# Patient Record
Sex: FEMALE | Race: WHITE | NOT HISPANIC OR LATINO | Employment: OTHER | URBAN - METROPOLITAN AREA
[De-identification: names, ages, dates, MRNs, and addresses within clinical notes are randomized per-mention and may not be internally consistent; named-entity substitution may affect disease eponyms.]

---

## 2017-07-18 ENCOUNTER — OFFICE VISIT (OUTPATIENT)
Dept: GASTROENTEROLOGY | Facility: CLINIC | Age: 67
End: 2017-07-18

## 2017-07-18 VITALS
SYSTOLIC BLOOD PRESSURE: 132 MMHG | HEIGHT: 66 IN | BODY MASS INDEX: 26.74 KG/M2 | DIASTOLIC BLOOD PRESSURE: 86 MMHG | WEIGHT: 166.4 LBS | TEMPERATURE: 98.6 F

## 2017-07-18 DIAGNOSIS — R10.32 LLQ PAIN: Primary | ICD-10-CM

## 2017-07-18 DIAGNOSIS — R19.7 DIARRHEA, UNSPECIFIED TYPE: ICD-10-CM

## 2017-07-18 PROCEDURE — 99204 OFFICE O/P NEW MOD 45 MIN: CPT | Performed by: INTERNAL MEDICINE

## 2017-07-18 RX ORDER — ALBUTEROL SULFATE 90 UG/1
2 AEROSOL, METERED RESPIRATORY (INHALATION) EVERY 4 HOURS PRN
COMMUNITY

## 2017-07-18 RX ORDER — DICYCLOMINE HCL 20 MG
20 TABLET ORAL EVERY 6 HOURS PRN
Qty: 60 TABLET | Refills: 3 | Status: SHIPPED | OUTPATIENT
Start: 2017-07-18 | End: 2021-06-01

## 2017-07-18 RX ORDER — MONTELUKAST SODIUM 10 MG/1
10 TABLET ORAL NIGHTLY
COMMUNITY
Start: 2017-06-02

## 2017-07-18 RX ORDER — LEVOTHYROXINE SODIUM 88 UG/1
88 TABLET ORAL
COMMUNITY
Start: 2017-06-02

## 2017-07-18 NOTE — PROGRESS NOTES
Chief Complaint   Patient presents with   • Irritable Bowel Syndrome   • Abdominal Pain     LLQ     Linsey Garcia is a 67 y.o. female who presents with IBS-a and LLQ pain.  Last c/s 2014 by Dr arthur - diverticulosis, .  ZOILA had CRC at age 86.  She has some vaginal prolapse - hard to empty with fiber.  She had a CT in 2014 - pain was milder at that time.  Feels better at first when she has a BM and then can feel sore and irritated after continued BMs.  Took levaquin for presumed diverticulitis in June - no change.  Antibiotics have helped over the years.  At this point having continuous discomfort in llq - no fever/chills.    Recent labs with normal liver function tests.    HPI  Past Medical History:   Diagnosis Date   • Hypertension    • Thyroid disease      Past Surgical History:   Procedure Laterality Date   • CHOLECYSTECTOMY  2001   • HYSTERECTOMY  1990   • TONSILECTOMY, ADENOIDECTOMY, BILATERAL MYRINGOTOMY AND TUBES  1956   • TUBAL ABDOMINAL LIGATION  1979       Current Outpatient Prescriptions:   •  albuterol (PROVENTIL HFA;VENTOLIN HFA) 108 (90 BASE) MCG/ACT inhaler, Inhale 2 puffs., Disp: , Rfl:   •  dicyclomine (BENTYL) 20 MG tablet, Take 1 tablet by mouth Every 6 (Six) Hours As Needed (cramping, llq pain)., Disp: 60 tablet, Rfl: 3  •  levothyroxine (SYNTHROID, LEVOTHROID) 88 MCG tablet, , Disp: , Rfl:   •  metoprolol tartrate (LOPRESSOR) 25 MG tablet, , Disp: , Rfl:   •  montelukast (SINGULAIR) 10 MG tablet, , Disp: , Rfl:   Allergies   Allergen Reactions   • Codeine    • Penicillins      Social History     Social History   • Marital status:      Spouse name: N/A   • Number of children: N/A   • Years of education: N/A     Occupational History   • Not on file.     Social History Main Topics   • Smoking status: Never Smoker   • Smokeless tobacco: Not on file   • Alcohol use No   • Drug use: Not on file   • Sexual activity: Not on file     Other Topics Concern   • Not on file     Social History  Narrative   • No narrative on file     Family History   Problem Relation Age of Onset   • Colon cancer Maternal Grandmother      Review of Systems   Constitutional: Negative for appetite change, chills and unexpected weight change.   Gastrointestinal: Positive for abdominal pain, diarrhea and nausea. Negative for blood in stool and vomiting.   All other systems reviewed and are negative.    Vitals:    07/18/17 1608   BP: 132/86   Temp: 98.6 °F (37 °C)     Last Weight    07/18/17  1608   Weight: 166 lb 6.4 oz (75.5 kg)     Physical Exam   Constitutional: She appears well-developed and well-nourished.   HENT:   Head: Normocephalic and atraumatic.   Eyes: No scleral icterus.   Abdominal: Soft. She exhibits no distension and no mass. There is tenderness.   llq pain   Neurological: She is alert.   Skin: Skin is warm and dry.   Psychiatric: She has a normal mood and affect.     No images are attached to the encounter.  No notes on file  Linsey was seen today for irritable bowel syndrome and abdominal pain.    Diagnoses and all orders for this visit:    LLQ pain  -     CT Abdomen Pelvis With Contrast; Future    Diarrhea, unspecified type  -     CT Abdomen Pelvis With Contrast; Future    Other orders  -     dicyclomine (BENTYL) 20 MG tablet; Take 1 tablet by mouth Every 6 (Six) Hours As Needed (cramping, llq pain).    Plan:  - check ct scan given ongoing abd pain, known sigmoid diverticulosis  - trial antiospasmodic  - if CT neg, consider xifaxan

## 2017-07-25 ENCOUNTER — HOSPITAL ENCOUNTER (OUTPATIENT)
Dept: CT IMAGING | Facility: HOSPITAL | Age: 67
Discharge: HOME | End: 2017-07-25
Attending: INTERNAL MEDICINE | Admitting: INTERNAL MEDICINE

## 2017-07-25 DIAGNOSIS — R19.7 DIARRHEA, UNSPECIFIED TYPE: ICD-10-CM

## 2017-07-25 DIAGNOSIS — R10.32 LLQ PAIN: ICD-10-CM

## 2017-07-25 LAB — CREAT BLDA-MCNC: 0.9 MG/DL (ref 0.6–1.3)

## 2017-07-25 PROCEDURE — 74177 CT ABD & PELVIS W/CONTRAST: CPT

## 2017-07-25 PROCEDURE — 0 IOPAMIDOL 61 % SOLUTION: Performed by: INTERNAL MEDICINE

## 2017-07-25 PROCEDURE — 82565 ASSAY OF CREATININE: CPT

## 2017-07-25 RX ADMIN — IOPAMIDOL 85 ML: 612 INJECTION, SOLUTION INTRAVENOUS at 11:00

## 2017-07-27 ENCOUNTER — TELEPHONE (OUTPATIENT)
Dept: GASTROENTEROLOGY | Facility: CLINIC | Age: 67
End: 2017-07-27

## 2017-07-27 NOTE — TELEPHONE ENCOUNTER
Called pt and advised per Dr Neely that the ct does not show any acute findings to explain her abd pain.     Pt verb understanding.   Pt states that the pain is not as bad , but is still there and it is going into her groin area.  Also pt reports that she is still having watery diarrhea.  Advised would update Dr Neely.

## 2017-07-27 NOTE — TELEPHONE ENCOUNTER
----- Message from Laurel Neely MD sent at 7/26/2017  6:43 PM EDT -----  CT does not show any acute findings to explain her abdominal pain-how are her symptoms?

## 2017-07-28 NOTE — TELEPHONE ENCOUNTER
Called pt and advised per Dr Neely that she recommends continuing bentyl as needed for discomfort.  Consider trial xifaxan for her ibs symptoms. She has sent to pharmacy- this help with loose stools- should not cause constipation.  14 days course and come with 2 refills which she can hold on to and use at her discretion if symptoms return or flare.  Also advised to f/u in 4 wks     Pt verb understanding and made appt for 08/28 at 10 am with Dong HURD.      Pt states her pharmacy told her that the xifaxan needs a pa.  Message sent to Katelyn.

## 2017-07-28 NOTE — TELEPHONE ENCOUNTER
Recommend continuing bentyl as needed for discomfort.  Consider trial xifaxan for her ibs symptoms - I will send to pharmacy - this helps with loose stools - should not cause constipation.  14 day course and come with 2 refills which she can hold on to and use at her discretion if symptoms return or flare - please schedule 4 week f/u with me or np

## 2017-07-28 NOTE — TELEPHONE ENCOUNTER
"PT RETURNING CALL, SAYS NO ONE HAS GOT BACK WITH HER    Call patient Received: Today       Francine Ramseyalina Colorado, RN; Grace Lombardi, RN       Phone Number: [phone]      Call to pt.  States that R groin pain is a little better today - Bentyl is helping.  Ranks this pain between 4-5.   States this pain is \"right below hysterectomy site\".  Not sure related to that, or if bowel issue.   Denies diarrhea today.  States bowel pattern seems to be to have loose brown stool first thing in am, followed by 2 more about 10 minutes apart.  States then has no bowel issues in afternoon.  Asking if this is something she should just expect with IBS, or is there something more she should be doing to manage this.  Advise pt will send update to DR Neely.  Pt verb understanding.    "

## 2017-08-03 ENCOUNTER — DOCUMENTATION (OUTPATIENT)
Dept: GASTROENTEROLOGY | Facility: CLINIC | Age: 67
End: 2017-08-03

## 2017-08-03 NOTE — PROGRESS NOTES
Pa sent via [facility] for xifaxan 550 mg tablets tid count 42 for 14 days. Dx IBS-D.

## 2017-08-07 ENCOUNTER — TELEPHONE (OUTPATIENT)
Dept: GASTROENTEROLOGY | Facility: CLINIC | Age: 67
End: 2017-08-07

## 2017-08-07 NOTE — TELEPHONE ENCOUNTER
----- Message from Zahida Stallworth sent at 8/7/2017 11:01 AM EDT -----  Regarding: PT CALLED - QUESTIONS REGARDING XIFAXAN  Contact: [phone]  PT REGARDING INFO. CONCERNED ABOUT SOME OF THE SIDE EFFECTS.

## 2017-08-07 NOTE — TELEPHONE ENCOUNTER
"Call to pt.  States has picked up Xifaxan and is concerned about possible side effects.  Advise pt that generally well tolerated, and targets bacteria in gut.  Advise that ultimately pt decision.  If any problems contact this office.  PT verb understanding and states \"will give it a try.\".   "

## 2017-08-16 ENCOUNTER — DOCUMENTATION (OUTPATIENT)
Dept: GASTROENTEROLOGY | Facility: CLINIC | Age: 67
End: 2017-08-16

## 2017-08-28 ENCOUNTER — OFFICE VISIT (OUTPATIENT)
Dept: GASTROENTEROLOGY | Facility: CLINIC | Age: 67
End: 2017-08-28

## 2017-08-28 VITALS
WEIGHT: 166.2 LBS | TEMPERATURE: 98.6 F | HEIGHT: 65 IN | DIASTOLIC BLOOD PRESSURE: 80 MMHG | SYSTOLIC BLOOD PRESSURE: 124 MMHG | BODY MASS INDEX: 27.69 KG/M2

## 2017-08-28 DIAGNOSIS — R19.7 DIARRHEA, UNSPECIFIED TYPE: Primary | ICD-10-CM

## 2017-08-28 DIAGNOSIS — R63.0 ANOREXIA: ICD-10-CM

## 2017-08-28 DIAGNOSIS — K21.9 GASTROESOPHAGEAL REFLUX DISEASE, ESOPHAGITIS PRESENCE NOT SPECIFIED: ICD-10-CM

## 2017-08-28 DIAGNOSIS — R10.32 LLQ ABDOMINAL PAIN: ICD-10-CM

## 2017-08-28 LAB — TSH SERPL DL<=0.005 MIU/L-ACNC: 1.72 MIU/ML (ref 0.27–4.2)

## 2017-08-28 PROCEDURE — 99214 OFFICE O/P EST MOD 30 MIN: CPT | Performed by: NURSE PRACTITIONER

## 2017-08-28 RX ORDER — SODIUM CHLORIDE 0.9 % (FLUSH) 0.9 %
1-10 SYRINGE (ML) INJECTION AS NEEDED
Status: CANCELLED | OUTPATIENT
Start: 2017-08-28

## 2017-08-29 LAB
ENDOMYSIUM IGA SER QL: NEGATIVE
GLIADIN PEPTIDE IGA SER-ACNC: 9 UNITS (ref 0–19)
GLIADIN PEPTIDE IGG SER-ACNC: 8 UNITS (ref 0–19)
IGA SERPL-MCNC: 213 MG/DL (ref 87–352)
TTG IGA SER-ACNC: <2 U/ML (ref 0–3)
TTG IGG SER-ACNC: <2 U/ML (ref 0–5)

## 2017-08-30 NOTE — PROGRESS NOTES
Can you please inform Mrs. Garcia her TSH and celiac panel were both normal. EGD and cscope with Dr Neely on 9/1

## 2017-09-01 ENCOUNTER — OUTSIDE FACILITY SERVICE (OUTPATIENT)
Dept: GASTROENTEROLOGY | Facility: CLINIC | Age: 67
End: 2017-09-01

## 2017-09-01 PROCEDURE — 45385 COLONOSCOPY W/LESION REMOVAL: CPT | Performed by: INTERNAL MEDICINE

## 2017-09-01 PROCEDURE — 43239 EGD BIOPSY SINGLE/MULTIPLE: CPT | Performed by: INTERNAL MEDICINE

## 2017-09-01 PROCEDURE — 45380 COLONOSCOPY AND BIOPSY: CPT | Performed by: INTERNAL MEDICINE

## 2017-09-07 ENCOUNTER — TELEPHONE (OUTPATIENT)
Dept: GASTROENTEROLOGY | Facility: CLINIC | Age: 67
End: 2017-09-07

## 2017-09-07 NOTE — TELEPHONE ENCOUNTER
----- Message from EDMUND Whyte sent at 8/30/2017  7:53 AM EDT -----  Can you please inform Mrs. Garcia her TSH and celiac panel were both normal. EGD and cscope with Dr Neely on 9/1

## 2017-09-07 NOTE — TELEPHONE ENCOUNTER
Call to pt. Advise per EDMUND Call, that TSH and celiac panel both normal.  Advise pt do not yet have back scope results - will call when available.  Pt verb understanding.

## 2017-09-11 ENCOUNTER — TELEPHONE (OUTPATIENT)
Dept: GASTROENTEROLOGY | Facility: CLINIC | Age: 67
End: 2017-09-11

## 2017-09-11 NOTE — TELEPHONE ENCOUNTER
----- Message from Zahida Stallworth sent at 9/11/2017 11:19 AM EDT -----  Regarding: PT CALLED - PATH RESULTS  Contact: [phone]  PT HAD PROCEDURE LAST WK.

## 2017-09-12 NOTE — TELEPHONE ENCOUNTER
Call to pt.  Advise per Dr Neely that the polyp(s) showed hyperplastic change, which is non-cancerous and not precancerous.  F/u c/s in 10 yrs is advised.    Mild inflammation seen in stomach.  Continue omeprazole as prescribed at time of scopes.    Small bowel bx normal, colon bx nml - would pt like to tx rx for diarrhea.    Pt verb understanding and states since has improved on omeprazole.  Notes LLQ pain now only 2-3 times/week lasting 10-15 min.  Ranks at 4-5 on pain scale.  States stools now soft formed - having 3-4/day.  States has used 1/2 Imodium as instructed with o/v of 8/28 - next day had trouble having BM.  Worried to use because of hx of anal fissure.  Update to Dr Neely.    C/s for 9/1/27 placed in recall.

## 2017-09-12 NOTE — TELEPHONE ENCOUNTER
Called pt and advised per Dr Neely to continue current meds for now and hold off on imodium.  Use dicyclomine as needed and f/u in 6-8 wks to reassess. Pt verb understanding and state she will call back to make appt.

## 2017-09-12 NOTE — TELEPHONE ENCOUNTER
pls continue current meds for now - hold off on imodium.  Use dicyclomine prn - schedule f/u in 6-8 weeks to reassess

## 2017-09-12 NOTE — TELEPHONE ENCOUNTER
The polyp(s) showed hyperplastic change, which is non-cancerous and not pre-cancerous. Follow-up colonoscopy in 10 years is advised.     Mild inflammation seen in stomach - cont omprazole as prescribed at time of scopes.    Small bowel bx normal, colon bx nml - would she like to try medication for diarrhea?

## 2021-03-09 DIAGNOSIS — Z23 IMMUNIZATION DUE: ICD-10-CM

## 2021-06-01 ENCOUNTER — OFFICE VISIT (OUTPATIENT)
Dept: GASTROENTEROLOGY | Facility: CLINIC | Age: 71
End: 2021-06-01

## 2021-06-01 VITALS
BODY MASS INDEX: 27.06 KG/M2 | HEIGHT: 66 IN | WEIGHT: 168.4 LBS | SYSTOLIC BLOOD PRESSURE: 118 MMHG | DIASTOLIC BLOOD PRESSURE: 82 MMHG

## 2021-06-01 DIAGNOSIS — N81.6 RECTOCELE: ICD-10-CM

## 2021-06-01 DIAGNOSIS — K86.2 PANCREATIC CYST: Primary | ICD-10-CM

## 2021-06-01 PROCEDURE — 99203 OFFICE O/P NEW LOW 30 MIN: CPT | Performed by: INTERNAL MEDICINE

## 2021-06-01 RX ORDER — PANTOPRAZOLE SODIUM 40 MG/1
40 TABLET, DELAYED RELEASE ORAL DAILY
COMMUNITY

## 2021-06-01 NOTE — PROGRESS NOTES
Subjective   Chief Complaint   Patient presents with   • Constipation   • Irritable Bowel Syndrome       Linsey Garcia is a  71 y.o. female here for constipation and irritable bowel syndrome.  She was last seen at the time of her colonoscopy in August 2017.  She underwent an EGD and colonoscopy at that time.  She had a hyperplastic polyp removed from her colon.  Internal hemorrhoids and diverticulosis were also noted.    She reports a history of chronic constipation.  She has a known rectocele.  She feels like she is sitting on a ball.  She has tried fiber in the past with no appreciable difference.  She has about 4 or 5 bowel movements a day that are small volume.  She feels like she has difficulty emptying.  No blood in the stool.    She had imaging done for urinary tract infection which incidentally found a pancreatic cystic lesion of the head of the pancreas.  Recent repeat CT scan in March 2021 showed an increase in size from 1.2 to 1.5 cm.  She has no discomfort nausea or vomiting.  HPI  Past Medical History:   Diagnosis Date   • Hypertension    • Irritable bowel syndrome    • Thyroid disease      Past Surgical History:   Procedure Laterality Date   • CHOLECYSTECTOMY  2001   • COLONOSCOPY  01/27/2014    Dr. Cline, diverticulosis in sigmoid colon, IH, otherwise normal   • HYSTERECTOMY  1990   • TONSILECTOMY, ADENOIDECTOMY, BILATERAL MYRINGOTOMY AND TUBES  1956   • TUBAL ABDOMINAL LIGATION  1979   • UPPER GASTROINTESTINAL ENDOSCOPY  01/27/2014    Dr. Cline, z-line irreg, 40 cm from incisors, Non-bleeding erosive gastropathy, otherwise normal       Current Outpatient Medications:   •  albuterol (PROVENTIL HFA;VENTOLIN HFA) 108 (90 BASE) MCG/ACT inhaler, Inhale 2 puffs., Disp: , Rfl:   •  Fluticasone Furoate-Vilanterol (BREO ELLIPTA IN), Inhale., Disp: , Rfl:   •  levothyroxine (SYNTHROID, LEVOTHROID) 88 MCG tablet, , Disp: , Rfl:   •  metoprolol tartrate (LOPRESSOR) 25 MG tablet, , Disp: , Rfl:   •   montelukast (SINGULAIR) 10 MG tablet, , Disp: , Rfl:   •  pantoprazole (PROTONIX) 40 MG EC tablet, Take 40 mg by mouth Daily., Disp: , Rfl:   PRN Meds:.  Allergies   Allergen Reactions   • Codeine    • Penicillins      Social History     Socioeconomic History   • Marital status:      Spouse name: Not on file   • Number of children: Not on file   • Years of education: Not on file   • Highest education level: Not on file   Tobacco Use   • Smoking status: Never Smoker   • Smokeless tobacco: Never Used   Substance and Sexual Activity   • Alcohol use: No     Family History   Problem Relation Age of Onset   • Colon cancer Maternal Grandmother      Review of Systems   Constitutional: Negative for appetite change and unexpected weight change.   Gastrointestinal: Positive for constipation. Negative for abdominal pain and blood in stool.     Vitals:    06/01/21 1422   BP: 118/82         06/01/21  1422   Weight: 76.4 kg (168 lb 6.4 oz)       Objective   Physical Exam  Constitutional:       Appearance: Normal appearance.   Abdominal:      General: There is no distension.   Neurological:      Mental Status: She is alert.       No radiology results for the last 7 days    Assessment/Plan   Diagnoses and all orders for this visit:    Pancreatic cyst  -     MRI abdomen w wo contrast mrcp; Future    Rectocele    Other orders  -     pantoprazole (PROTONIX) 40 MG EC tablet; Take 40 mg by mouth Daily.  -     Fluticasone Furoate-Vilanterol (BREO ELLIPTA IN); Inhale.      Plan:  · Recommend MRI of the abdomen for further evaluation of the pancreatic cystic lesion.  This will help determine whether further evaluation is needed and how to follow this lesion in the future.  · Recommend trial of MiraLAX one half capful every day or every other day for constipation related to outlet dysfunction and rectocele.

## 2021-06-14 DIAGNOSIS — K86.2 PANCREATIC CYST: ICD-10-CM

## 2021-07-02 ENCOUNTER — TELEPHONE (OUTPATIENT)
Dept: GASTROENTEROLOGY | Facility: CLINIC | Age: 71
End: 2021-07-02

## 2021-07-02 DIAGNOSIS — K86.2 PANCREATIC CYST: Primary | ICD-10-CM

## 2021-07-02 DIAGNOSIS — D37.9 NEOPLASM OF UNCERTAIN BEHAVIOR OF DIGESTIVE ORGAN, UNSPECIFIED: ICD-10-CM

## 2021-07-02 NOTE — TELEPHONE ENCOUNTER
Call patient and discussed options with her-check CA 19-9 and if normal follow with annual MRI versus proceed with EUS for further evaluation and definitive diagnosis.  She would like to check a CA 19-9 and reimage in a year if normal.  She plans to come in for labs today

## 2021-07-02 NOTE — TELEPHONE ENCOUNTER
----- Message from Gomez Sanders MD sent at 7/1/2021  7:31 AM EDT -----  You could go either way on this one.  That growth rate over 8 years is quite slow, and not really worrisome.  The description of the cyst by MRI is reassuring as well.  You could check a CA 19-9, and if normal, offer to continue to monitor radiographically with annual MRI, or if patient has concerns about the cyst and wants more definitive information, I can arrange for EUS.  I often let patient preference quide these decisions when we are in a grey area.  Thanks      ----- Message -----  From: Laurel Neely MD  Sent: 6/17/2021  11:51 AM EDT  To: Gomez Sanders MD    Lady with an asymptomatic cystic pancreatic lesion which has been present since 2013 and has grown from 0.9 to 1.7 cm in that time.  Recent MRCP attached at [facility]-given that the cyst has grown and is greater than 1.5 cm, would you consider EUS?

## 2021-07-03 LAB — CANCER AG19-9 SERPL-ACNC: 31 U/ML (ref 0–35)

## 2021-07-06 DIAGNOSIS — K86.2 PANCREATIC CYST: Primary | ICD-10-CM

## 2021-08-18 ENCOUNTER — TELEPHONE (OUTPATIENT)
Dept: GASTROENTEROLOGY | Facility: CLINIC | Age: 71
End: 2021-08-18

## 2021-08-18 NOTE — TELEPHONE ENCOUNTER
Called pt and advised of Dr Neely's note. Verb understanding.    Mri placed in recall for 07/01/2022.

## 2021-08-18 NOTE — TELEPHONE ENCOUNTER
----- Message from Laurel Neely MD sent at 7/6/2021  8:04 AM EDT -----  CA 19-9 was normal-recommend repeat imaging in 1 year as we discussed given normal lab findings

## 2022-03-18 ENCOUNTER — HOSPITAL ENCOUNTER (OUTPATIENT)
Dept: GENERAL RADIOLOGY | Facility: HOSPITAL | Age: 72
Discharge: HOME | End: 2022-03-18
Admitting: PHYSICIAN ASSISTANT

## 2022-03-18 ENCOUNTER — OFFICE VISIT (OUTPATIENT)
Dept: GASTROENTEROLOGY | Facility: CLINIC | Age: 72
End: 2022-03-18

## 2022-03-18 VITALS — HEIGHT: 65 IN | WEIGHT: 162 LBS | BODY MASS INDEX: 26.99 KG/M2 | TEMPERATURE: 96.8 F

## 2022-03-18 DIAGNOSIS — N81.6 RECTOCELE: ICD-10-CM

## 2022-03-18 DIAGNOSIS — R10.32 LEFT LOWER QUADRANT ABDOMINAL PAIN: ICD-10-CM

## 2022-03-18 DIAGNOSIS — K58.1 IRRITABLE BOWEL SYNDROME WITH CONSTIPATION: Primary | ICD-10-CM

## 2022-03-18 DIAGNOSIS — K21.9 GASTROESOPHAGEAL REFLUX DISEASE WITHOUT ESOPHAGITIS: ICD-10-CM

## 2022-03-18 DIAGNOSIS — K86.2 PANCREATIC CYST: ICD-10-CM

## 2022-03-18 PROCEDURE — 99214 OFFICE O/P EST MOD 30 MIN: CPT | Performed by: PHYSICIAN ASSISTANT

## 2022-03-18 PROCEDURE — 74018 RADEX ABDOMEN 1 VIEW: CPT

## 2022-03-18 RX ORDER — CONJUGATED ESTROGENS 0.62 MG/G
CREAM VAGINAL
COMMUNITY
Start: 2022-03-16 | End: 2022-08-05

## 2022-03-18 NOTE — PROGRESS NOTES
"Chief Complaint  Irritable Bowel Syndrome, Abdominal Pain, Diarrhea, and Constipation    Subjective          History of Present Illness    Linsey Garcia is a  72 y.o. female presents for follow-up on IBS constipation predominant but also with some diarrhea recently and abdominal pain.  She is a patient of Dr. Neely last seen on 6/1/2021.  She is new to me.    She has history of chronic constipation and known rectocele.  She was to try MiraLAX after last office visit. She reports LLQ pain--burning, gas, bloating that started about a month ago-worsening. She has lost 4lbs in last month. She reports alternating bowel habits, diarrhea, and constipation. She tried miralax which didn't seem to make a difference. No fever, blood in stool, melena, or N/V.  Failed fiber in the past.    She has history of GERD and takes pantoprazole 40 mg once daily. Well controlled.     She also has history of incidental findings of pancreatic cystic lesion in the head of the pancreas.  This is being followed with imaging.  7/2021 CA 19-9 was normal-recommend repeat imaging in 1 year-MRI    August 2017.  She underwent an EGD and colonoscopy at that time.  She had a hyperplastic polyp removed from her colon.  Internal hemorrhoids and diverticulosis were also noted.  Recall for colonoscopy recommended in 10 years.    Brother was diagnosed with appendiceal cancer last year. +FHCC in grandmother at age 86.     Objective   Vital Signs:   Temp 96.8 °F (36 °C)   Ht 165.1 cm (65\")   Wt 73.5 kg (162 lb)   BMI 26.96 kg/m²       Physical Exam  Vitals reviewed.   Constitutional:       General: She is awake. She is not in acute distress.     Appearance: Normal appearance. She is well-developed and well-groomed.   HENT:      Head: Normocephalic and atraumatic.      Mouth/Throat:      Mouth: Mucous membranes are moist.   Cardiovascular:      Rate and Rhythm: Normal rate and regular rhythm.      Heart sounds: Normal heart sounds.   Pulmonary:      Effort: " Pulmonary effort is normal.      Breath sounds: Normal breath sounds.   Abdominal:      General: Abdomen is flat. Bowel sounds are normal. There is no distension.      Palpations: Abdomen is soft. There is no mass.      Tenderness: There is abdominal tenderness in the left lower quadrant. There is no guarding or rebound. Negative signs include Marquez's sign.   Skin:     General: Skin is warm and dry.   Neurological:      Mental Status: She is alert and oriented to person, place, and time.      Gait: Gait normal.   Psychiatric:         Mood and Affect: Affect normal.         Speech: Speech normal.         Behavior: Behavior is cooperative.         Judgment: Judgment normal.          Result Review :             Assessment and Plan    Diagnoses and all orders for this visit:    1. Irritable bowel syndrome with constipation (Primary)  -     XR Abdomen KUB  -     CT Abdomen Pelvis With Contrast  -     CBC & Differential  -     Comprehensive Metabolic Panel  -     C-reactive Protein  -     Lipase    2. Pancreatic cyst  -     CBC & Differential  -     Comprehensive Metabolic Panel  -     C-reactive Protein  -     Lipase    3. Rectocele  -     XR Abdomen KUB  -     CT Abdomen Pelvis With Contrast  -     CBC & Differential  -     Comprehensive Metabolic Panel  -     C-reactive Protein  -     Lipase    4. Gastroesophageal reflux disease without esophagitis  -     CBC & Differential  -     Comprehensive Metabolic Panel  -     C-reactive Protein  -     Lipase    5. Left lower quadrant abdominal pain  -     XR Abdomen KUB  -     CT Abdomen Pelvis With Contrast  -     CBC & Differential  -     Comprehensive Metabolic Panel  -     C-reactive Protein  -     Lipase    Recommend work up with labs and abdominal xray today. Proceed with CT scan ASAP.     Follow Up   Return in about 4 weeks (around 4/15/2022) for Dr. Neely or Linnea.    Aguila dictation used throughout this note.     Linnea Marquez PA-C

## 2022-03-20 LAB
ALBUMIN SERPL-MCNC: 4.2 G/DL (ref 3.7–4.7)
ALBUMIN/GLOB SERPL: 1.5 {RATIO} (ref 1.2–2.2)
ALP SERPL-CCNC: 71 IU/L (ref 44–121)
ALT SERPL-CCNC: 13 IU/L (ref 0–32)
AST SERPL-CCNC: 17 IU/L (ref 0–40)
BASOPHILS # BLD AUTO: 0.1 X10E3/UL (ref 0–0.2)
BASOPHILS NFR BLD AUTO: 1 %
BILIRUB SERPL-MCNC: 0.4 MG/DL (ref 0–1.2)
BUN SERPL-MCNC: 14 MG/DL (ref 8–27)
BUN/CREAT SERPL: 18 (ref 12–28)
CALCIUM SERPL-MCNC: 9.3 MG/DL (ref 8.7–10.3)
CHLORIDE SERPL-SCNC: 102 MMOL/L (ref 96–106)
CO2 SERPL-SCNC: 24 MMOL/L (ref 20–29)
CREAT SERPL-MCNC: 0.8 MG/DL (ref 0.57–1)
CRP SERPL-MCNC: 3 MG/L (ref 0–10)
EGFRCR SERPLBLD CKD-EPI 2021: 79 ML/MIN/1.73
EOSINOPHIL # BLD AUTO: 0.3 X10E3/UL (ref 0–0.4)
EOSINOPHIL NFR BLD AUTO: 3 %
ERYTHROCYTE [DISTWIDTH] IN BLOOD BY AUTOMATED COUNT: 12.6 % (ref 11.7–15.4)
GLOBULIN SER CALC-MCNC: 2.8 G/DL (ref 1.5–4.5)
GLUCOSE SERPL-MCNC: 107 MG/DL (ref 65–99)
HCT VFR BLD AUTO: 44.6 % (ref 34–46.6)
HGB BLD-MCNC: 15.1 G/DL (ref 11.1–15.9)
IMM GRANULOCYTES # BLD AUTO: 0 X10E3/UL (ref 0–0.1)
IMM GRANULOCYTES NFR BLD AUTO: 0 %
LIPASE SERPL-CCNC: 27 U/L (ref 14–85)
LYMPHOCYTES # BLD AUTO: 5.4 X10E3/UL (ref 0.7–3.1)
LYMPHOCYTES NFR BLD AUTO: 54 %
MCH RBC QN AUTO: 30.5 PG (ref 26.6–33)
MCHC RBC AUTO-ENTMCNC: 33.9 G/DL (ref 31.5–35.7)
MCV RBC AUTO: 90 FL (ref 79–97)
MONOCYTES # BLD AUTO: 1 X10E3/UL (ref 0.1–0.9)
MONOCYTES NFR BLD AUTO: 9 %
NEUTROPHILS # BLD AUTO: 3.4 X10E3/UL (ref 1.4–7)
NEUTROPHILS NFR BLD AUTO: 33 %
PLATELET # BLD AUTO: 274 X10E3/UL (ref 150–450)
POTASSIUM SERPL-SCNC: 3.9 MMOL/L (ref 3.5–5.2)
PROT SERPL-MCNC: 7 G/DL (ref 6–8.5)
RBC # BLD AUTO: 4.95 X10E6/UL (ref 3.77–5.28)
SODIUM SERPL-SCNC: 141 MMOL/L (ref 134–144)
WBC # BLD AUTO: 10.3 X10E3/UL (ref 3.4–10.8)

## 2022-03-21 ENCOUNTER — TELEPHONE (OUTPATIENT)
Dept: GASTROENTEROLOGY | Facility: CLINIC | Age: 72
End: 2022-03-21

## 2022-03-21 NOTE — TELEPHONE ENCOUNTER
MRI may still be necessary given it is a better look at the pancreas.  Lets see what the CT scan shows first and can decide that later.

## 2022-03-21 NOTE — TELEPHONE ENCOUNTER
Call from pt.  [facility] can have CT tomorrow at [facility], but need preauth done before can schedule.  [facility] # to call with precert is [phone].     Message to Diego.

## 2022-03-21 NOTE — TELEPHONE ENCOUNTER
Reviewed labs, no concerns at present.  Borderline high normal WBC.Which would correlate with elevated lymphocytes.  If she has diverticulitis or colitis infection, we will see this on the CT scan.  Typically bacterial infections will show elevated neutrophils.  Her neutrophils are normal.  Monocytes are minimally elevated and not consequential.    Agree with ASAP CT scan this week.

## 2022-03-21 NOTE — TELEPHONE ENCOUNTER
Call to pt.  Advise per LEA Marquez note.  Verb understanding.     States son is MRI tech, so prefers to complete CT at [facility].  Is currently scheduled for 4/26.     Call to [employer] and spoke with Andreia.  [facility]ched[facility] [address]  Arrive at 12:15 for 1:30 appt.     Call to pt.  Advise of above.  Advise may also wish to pursue at [facility] or [facility].  Pt states has order in hand - will try to schedule at [facility] - will then update this office if able to obtain sooner appt.  We will then work on precert.      Pt states had labs done 3/19 - asking if any concern re: elevated lymphocytes and monocytes.  Question to LEA Marquez.

## 2022-03-21 NOTE — TELEPHONE ENCOUNTER
Call to pt.  Advise per LEA Marquez note.  Verb understanding.     Advise per Diego that CT precert under review by insurance.  May require peer to peer 2nd location.     Pt states is going to need MRI in July as f/u for pancreatic cyst.  Asking if that will then be necessary if has this CT.  Question to LEA Marquez.

## 2022-03-21 NOTE — TELEPHONE ENCOUNTER
----- Message from Linnea Marquez PA-C sent at 3/18/2022  5:03 PM EDT -----  Please let patient know that her x-ray shows small to moderate amount of stool burden scattered throughout the colon and some bowel gas.  Nothing really specific or indicative of bad constipation.  Recommend proceeding with CT scan as soon as possible to rule out anything else.  Her son is a radiology tech at [facility] so she was going to work with him to get scheduled there.  We will need to do the prior Auth.

## 2022-03-22 ENCOUNTER — TELEPHONE (OUTPATIENT)
Dept: GASTROENTEROLOGY | Facility: CLINIC | Age: 72
End: 2022-03-22

## 2022-03-22 NOTE — TELEPHONE ENCOUNTER
----- Message from Bean Mcgarry sent at 3/22/2022  3:31 PM EDT -----  [facility] order management is calling and they are not sure how you guys want the test ran they said that 2 test were ordered one was ct with contrast and the other was witout hey are wanting to know how they need to put that in because 2 of the same ones were put in. I spoke [facility] [phone] and the other number was [phone]

## 2022-03-22 NOTE — TELEPHONE ENCOUNTER
Call to [phone] and spoke with Gavin.  Faxed order received - they will complete CT abd pelvis with contrast and oral contrast as ordered.

## 2022-03-22 NOTE — TELEPHONE ENCOUNTER
Diego Pineda, Bean Rep  to Me    RM      1:45 PM  Auth has been approved. Auth # [id number]     **Call to pt.  Advise of above.  Call to [phone] - opt 3 and spoke with Merry.  Scheduled for 3/24 - arrive at 9am for 9:30 procedure.  Increase water intake 1 day prior.  Drink 32 oz water 2 hours prior.     Call to pt.  Advise of above.  Accepts appt.     Per instructions, order and demographic sheet faxed to [phone] - awaiting confirmation

## 2022-04-01 DIAGNOSIS — R19.4 CHANGE IN BOWEL HABITS: ICD-10-CM

## 2022-04-01 DIAGNOSIS — K58.0 IRRITABLE BOWEL SYNDROME WITH DIARRHEA: ICD-10-CM

## 2022-04-01 DIAGNOSIS — R10.32 LEFT LOWER QUADRANT ABDOMINAL PAIN: Primary | ICD-10-CM

## 2022-04-01 DIAGNOSIS — K59.09 OTHER CONSTIPATION: ICD-10-CM

## 2022-04-04 ENCOUNTER — TELEPHONE (OUTPATIENT)
Dept: GASTROENTEROLOGY | Facility: CLINIC | Age: 72
End: 2022-04-04

## 2022-04-04 PROBLEM — R19.4 CHANGE IN BOWEL HABITS: Status: ACTIVE | Noted: 2022-04-04

## 2022-04-04 PROBLEM — K58.0 IRRITABLE BOWEL SYNDROME WITH DIARRHEA: Status: ACTIVE | Noted: 2022-04-04

## 2022-04-04 PROBLEM — R10.32 LEFT LOWER QUADRANT ABDOMINAL PAIN: Status: ACTIVE | Noted: 2022-04-04

## 2022-04-04 NOTE — TELEPHONE ENCOUNTER
SONYA patient via telephone for colonoscopy. Scheduled 08/08/2022 with arrival time 2:30pm. Prep packet mailed to verified address on file. Also advised arrival time may change based on [facility] guidelines. SONYA Huggins--Pat

## 2022-04-05 ENCOUNTER — TELEPHONE (OUTPATIENT)
Dept: GASTROENTEROLOGY | Facility: CLINIC | Age: 72
End: 2022-04-05

## 2022-04-07 ENCOUNTER — APPOINTMENT (OUTPATIENT)
Dept: CT IMAGING | Facility: HOSPITAL | Age: 72
End: 2022-04-07

## 2022-04-07 ENCOUNTER — TELEPHONE (OUTPATIENT)
Dept: GASTROENTEROLOGY | Facility: CLINIC | Age: 72
End: 2022-04-07

## 2022-04-07 NOTE — TELEPHONE ENCOUNTER
VM received from pt.  Reports has had CT done by Dr Marquez - shows no change to pancreas.  Is to have MRI in July.  Asking if, since CT shows no change, could she wait 1 year for MRI.     See notes of 3/22 - CT ordered by ADIEL Juarez.     Call to pt.  Advise Dr Neely out of office.  Will address question to LEA Marquez.  Verb understanding.

## 2022-04-13 DIAGNOSIS — K86.2 PANCREATIC CYST: Primary | ICD-10-CM

## 2022-04-13 NOTE — TELEPHONE ENCOUNTER
Reviewed with Dr. Neely.  She okayed to repeat MRI in 1 year.  So we can count the CT scan as an evaluation of her pancreas.  She put an order.

## 2022-04-26 ENCOUNTER — APPOINTMENT (OUTPATIENT)
Dept: CT IMAGING | Facility: HOSPITAL | Age: 72
End: 2022-04-26

## 2022-08-02 ENCOUNTER — TELEPHONE (OUTPATIENT)
Dept: GASTROENTEROLOGY | Facility: CLINIC | Age: 72
End: 2022-08-02

## 2022-08-02 NOTE — TELEPHONE ENCOUNTER
Pt called in today wanting to know if she was going to have an EGD along with her colonoscopy. Please call [phone].

## 2022-08-08 ENCOUNTER — ANESTHESIA EVENT (OUTPATIENT)
Dept: GASTROENTEROLOGY | Facility: HOSPITAL | Age: 72
End: 2022-08-08

## 2022-08-08 ENCOUNTER — ANESTHESIA (OUTPATIENT)
Dept: GASTROENTEROLOGY | Facility: HOSPITAL | Age: 72
End: 2022-08-08

## 2022-08-08 ENCOUNTER — HOSPITAL ENCOUNTER (OUTPATIENT)
Facility: HOSPITAL | Age: 72
Setting detail: HOSPITAL OUTPATIENT SURGERY
Discharge: HOME | End: 2022-08-08
Attending: INTERNAL MEDICINE | Admitting: INTERNAL MEDICINE

## 2022-08-08 VITALS
TEMPERATURE: 98.1 F | WEIGHT: 164 LBS | RESPIRATION RATE: 16 BRPM | DIASTOLIC BLOOD PRESSURE: 82 MMHG | BODY MASS INDEX: 26.36 KG/M2 | OXYGEN SATURATION: 98 % | HEIGHT: 66 IN | SYSTOLIC BLOOD PRESSURE: 110 MMHG | HEART RATE: 73 BPM

## 2022-08-08 DIAGNOSIS — K59.09 OTHER CONSTIPATION: ICD-10-CM

## 2022-08-08 DIAGNOSIS — R19.4 CHANGE IN BOWEL HABITS: ICD-10-CM

## 2022-08-08 DIAGNOSIS — R10.32 LEFT LOWER QUADRANT ABDOMINAL PAIN: ICD-10-CM

## 2022-08-08 DIAGNOSIS — K58.0 IRRITABLE BOWEL SYNDROME WITH DIARRHEA: ICD-10-CM

## 2022-08-08 PROCEDURE — 45380 COLONOSCOPY AND BIOPSY: CPT | Performed by: INTERNAL MEDICINE

## 2022-08-08 PROCEDURE — 25010000002 PROPOFOL 10 MG/ML EMULSION: Performed by: ANESTHESIOLOGY

## 2022-08-08 PROCEDURE — S0260 H&P FOR SURGERY: HCPCS | Performed by: INTERNAL MEDICINE

## 2022-08-08 PROCEDURE — 88305 TISSUE EXAM BY PATHOLOGIST: CPT | Performed by: INTERNAL MEDICINE

## 2022-08-08 RX ORDER — LIDOCAINE HYDROCHLORIDE 20 MG/ML
INJECTION, SOLUTION INFILTRATION; PERINEURAL AS NEEDED
Status: DISCONTINUED | OUTPATIENT
Start: 2022-08-08 | End: 2022-08-08 | Stop reason: SURG

## 2022-08-08 RX ORDER — SODIUM CHLORIDE 0.9 % (FLUSH) 0.9 %
10 SYRINGE (ML) INJECTION AS NEEDED
Status: DISCONTINUED | OUTPATIENT
Start: 2022-08-08 | End: 2022-08-08 | Stop reason: HOSPADM

## 2022-08-08 RX ORDER — SODIUM CHLORIDE, SODIUM LACTATE, POTASSIUM CHLORIDE, CALCIUM CHLORIDE 600; 310; 30; 20 MG/100ML; MG/100ML; MG/100ML; MG/100ML
30 INJECTION, SOLUTION INTRAVENOUS CONTINUOUS PRN
Status: DISCONTINUED | OUTPATIENT
Start: 2022-08-08 | End: 2022-08-08 | Stop reason: HOSPADM

## 2022-08-08 RX ORDER — PROMETHAZINE HYDROCHLORIDE 25 MG/1
25 SUPPOSITORY RECTAL ONCE AS NEEDED
Status: DISCONTINUED | OUTPATIENT
Start: 2022-08-08 | End: 2022-08-08 | Stop reason: HOSPADM

## 2022-08-08 RX ORDER — PROMETHAZINE HYDROCHLORIDE 25 MG/1
25 TABLET ORAL ONCE AS NEEDED
Status: DISCONTINUED | OUTPATIENT
Start: 2022-08-08 | End: 2022-08-08 | Stop reason: HOSPADM

## 2022-08-08 RX ORDER — PROPOFOL 10 MG/ML
VIAL (ML) INTRAVENOUS CONTINUOUS PRN
Status: DISCONTINUED | OUTPATIENT
Start: 2022-08-08 | End: 2022-08-08 | Stop reason: SURG

## 2022-08-08 RX ADMIN — LIDOCAINE HYDROCHLORIDE 60 MG: 20 INJECTION, SOLUTION INFILTRATION; PERINEURAL at 13:07

## 2022-08-08 RX ADMIN — PROPOFOL 200 MCG/KG/MIN: 10 INJECTION, EMULSION INTRAVENOUS at 13:07

## 2022-08-08 RX ADMIN — SODIUM CHLORIDE, POTASSIUM CHLORIDE, SODIUM LACTATE AND CALCIUM CHLORIDE 30 ML/HR: 600; 310; 30; 20 INJECTION, SOLUTION INTRAVENOUS at 13:03

## 2022-08-08 NOTE — H&P
[facility]  Pre Procedure History & Physical    Chief Complaint:   Chronic left lower quadrant pain    Subjective     HPI:   71 yo here today for colonoscopy.  Pt reports + FH CRC.  Patient reports intermittent burning left lower quadrant pain.  Symptoms is better his bowel movement occurs.  She has 3 bowel movements most days-difficult to completely evacuate.  She has a history of rectocele..  Last exam 2017    Past Medical History:   Past Medical History:   Diagnosis Date   • Arthritis     ZENAIDA HANDS   • Asthma    • Cyst of pancreas    • Irritable bowel syndrome    • Palpitations    • Rectocele    • Throat irritation 08/05/2022    INTERMITTANT BURNING SENSATION - NOTIFIED SURGEON OFFICE - PT. AWAITING RETURN CALL   • Thyroid disease        Past Surgical History:  Past Surgical History:   Procedure Laterality Date   • CHOLECYSTECTOMY  2001   • COLONOSCOPY  01/27/2014    Dr. Cline, diverticulosis in sigmoid colon, IH, otherwise normal   • HYSTERECTOMY  1990   • TONSILECTOMY, ADENOIDECTOMY, BILATERAL MYRINGOTOMY AND TUBES  1956   • TUBAL ABDOMINAL LIGATION  1979   • UPPER GASTROINTESTINAL ENDOSCOPY  01/27/2014    Dr. Cline, z-line irreg, 40 cm from incisors, Non-bleeding erosive gastropathy, otherwise normal       Family History:  Family History   Problem Relation Age of Onset   • Cancer Brother         APPENDIX   • Colon cancer Brother    • Colon cancer Maternal Grandmother    • Malig Hyperthermia Neg Hx        Social History:   reports that she has never smoked. She has never used smokeless tobacco. She reports that she does not drink alcohol and does not use drugs.    Medications:   Medications Prior to Admission   Medication Sig Dispense Refill Last Dose   • albuterol (PROVENTIL HFA;VENTOLIN HFA) 108 (90 BASE) MCG/ACT inhaler Inhale 2 puffs Every 4 (Four) Hours As Needed for Wheezing.   Past Month at Unknown time   • levothyroxine (SYNTHROID, LEVOTHROID) 88 MCG tablet Take 88 mcg by  "mouth Every Morning.   8/7/2022 at Unknown time   • metoprolol tartrate (LOPRESSOR) 25 MG tablet Take 25 mg by mouth Daily.   8/7/2022 at 2200   • montelukast (SINGULAIR) 10 MG tablet Take 10 mg by mouth Every Night.   8/7/2022 at Unknown time   • pantoprazole (PROTONIX) 40 MG EC tablet Take 40 mg by mouth Daily.   8/7/2022 at Unknown time       Allergies:  Sulfa antibiotics, Codeine, and Penicillins    ROS:    Pertinent items are noted in HPI, all other systems reviewed and negative     Objective     Blood pressure 140/73, pulse 87, temperature 98.1 °F (36.7 °C), resp. rate 14, height 166.4 cm (65.5\"), weight 74.4 kg (164 lb), SpO2 98 %.    Physical Exam   Constitutional: Pt is oriented to person, place, and time and well-developed, well-nourished, and in no distress.   Mouth/Throat: Oropharynx is clear and moist.   Neck: Normal range of motion.   Cardiovascular: Normal rate, regular rhythm    Pulmonary/Chest: Effort normal    Abdominal: Soft. Nontender  Skin: Skin is warm and dry.   Psychiatric: Mood, memory, affect and judgment normal.     Assessment & Plan     Diagnosis:  Chronic left lower quadrant pain, family history of colon cancer    Anticipated Surgical Procedure:  colonoscopy    The risks, benefits, and alternatives of this procedure have been discussed with the patient or the responsible party- the patient understands and agrees to proceed.                                                              "

## 2022-08-08 NOTE — DISCHARGE INSTRUCTIONS
For the next 24 hours patient needs to be with a responsible adult.    For 24 hours DO NOT drive, operate machinery, appliances, drink alcohol, make important decisions or sign legal documents.    Start with a light or bland diet and advance to regular diet as tolerated.    Follow recommendations on procedure report provided by your doctor.    Call Dr Neely for problems [phone]    Problems may include but not limited to: large amounts of bleeding, trouble breathing, repeated vomiting, severe unrelieved pain, fever or chills.

## 2022-08-08 NOTE — ANESTHESIA PREPROCEDURE EVALUATION
Anesthesia Evaluation     NPO Solid Status: > 8 hours             Airway   Mallampati: II  TM distance: >3 FB  Neck ROM: full  Dental - normal exam     Pulmonary - normal exam   (+) asthma,  Cardiovascular - normal exam    (-) past MI      Neuro/Psych  GI/Hepatic/Renal/Endo    (+)   thyroid problem hypothyroidism    Musculoskeletal     Abdominal    Substance History      OB/GYN          Other   arthritis,                      Anesthesia Plan    ASA 2     MAC       Anesthetic plan, risks, benefits, and alternatives have been provided, discussed and informed consent has been obtained with: patient.        CODE STATUS:

## 2022-08-10 LAB
LAB AP CASE REPORT: NORMAL
PATH REPORT.FINAL DX SPEC: NORMAL
PATH REPORT.GROSS SPEC: NORMAL

## 2022-08-10 NOTE — PROGRESS NOTES
The polyp(s) biopsies showed adenomatous change. This is not cancerous but is considered potentially precancerous. Follow-up colonoscopy in 5 years is advised. Trial fiber supplementation daily - if symptoms are not improving after 1 month pls let me know

## 2022-08-17 ENCOUNTER — TELEPHONE (OUTPATIENT)
Dept: GASTROENTEROLOGY | Facility: CLINIC | Age: 72
End: 2022-08-17

## 2022-08-17 NOTE — TELEPHONE ENCOUNTER
Called pt and advised of Dr Neely's note. Verb understanding.     C/s placed in recall and hm for 08/08/2027.

## 2022-08-17 NOTE — TELEPHONE ENCOUNTER
----- Message from Laurel Neely MD sent at 8/10/2022  1:05 PM EDT -----  The polyp(s) biopsies showed adenomatous change. This is not cancerous but is considered potentially precancerous. Follow-up colonoscopy in 5 years is advised. Trial fiber supplementation daily - if symptoms are not improving after 1 month pls let me know

## 2023-02-27 ENCOUNTER — TELEPHONE (OUTPATIENT)
Dept: GASTROENTEROLOGY | Facility: CLINIC | Age: 73
End: 2023-02-27

## 2023-02-27 NOTE — TELEPHONE ENCOUNTER
Caller: Linsey Garcia    Relationship to patient: Self    Best call back number: [phone]      Type of visit: MRI      Additional notes: PT WANTS ORDER FOR MRI FAXED TO [facility] FAX# [phone]. PT WOULD LIKE TO GET MRI THERE.

## 2023-02-27 NOTE — TELEPHONE ENCOUNTER
Mri order fax to number below and fax confirmation received.      Called pt and advised of the above. Verb understanding.     Also message sent to Shaneka for precert.

## 2023-03-07 ENCOUNTER — TELEPHONE (OUTPATIENT)
Dept: GASTROENTEROLOGY | Facility: CLINIC | Age: 73
End: 2023-03-07
Payer: MEDICARE

## 2023-03-07 NOTE — TELEPHONE ENCOUNTER
----- Message from Bean Maddox sent at 3/7/2023  9:48 AM EST -----  Regarding: records  Contact: [phone]  Shilpa Youssef called and needs records faxed over.  Her fax number is [phone] and her phone number is [phone].  Thank you

## 2023-03-07 NOTE — TELEPHONE ENCOUNTER
Hub staff attempted to follow warm transfer process and was unsuccessful     Caller: Linsey Garcia    Relationship to patient: Self    Best call back number: [phone]    Patient is needing: PT CALLED IN TO SAY THAT SHE GAVE WRONG NUMBERS PREVIOUSLY. FOR SCHEDULING# [phone] OPT 3. FOR FAXING ORDER [phone]

## 2023-03-07 NOTE — TELEPHONE ENCOUNTER
Per discussion with Natasha, this pt is approved (in Media).  I have faxed a copy of the approval to the fax number provided below, confirmation recieved

## 2023-03-08 NOTE — TELEPHONE ENCOUNTER
Called pt and pt states she needs the mri order faxed to number below.  Order faxed to [phone] and fax confirmation received and scanned under media tab.

## 2023-03-30 ENCOUNTER — TELEPHONE (OUTPATIENT)
Dept: GASTROENTEROLOGY | Facility: CLINIC | Age: 73
End: 2023-03-30
Payer: MEDICARE

## 2023-03-30 NOTE — TELEPHONE ENCOUNTER
MRI shows stability of the pancreatic cyst.  This has been relatively stable since 2013 and has not changed in size since 2019.  Given stability over 10-year period, can defer further radiographic surveillance per guidelines

## 2023-04-03 ENCOUNTER — TELEPHONE (OUTPATIENT)
Dept: GASTROENTEROLOGY | Facility: CLINIC | Age: 73
End: 2023-04-03
Payer: MEDICARE

## 2023-04-03 NOTE — TELEPHONE ENCOUNTER
Hub staff attempted to follow warm transfer process and was unsuccessful     Caller: Linsey Garcia    Relationship to patient: Self    Best call back number: [phone]    Patient is needing:PT CALLED RETURNING CALL FROM [place]. PLEASE CALL BACK.

## 2023-11-17 NOTE — ANESTHESIA POSTPROCEDURE EVALUATION
Patient: Linsey Garcia    Procedure Summary     Date: 08/08/22 Room / Location: [facility] ENDOSCOPY 4 /  [facility]    Anesthesia Start: 1305 Anesthesia Stop: 1329    Procedure: COLONOSCOPY TO CECUM WITH COLD POLYPECTOMY (N/A ) Diagnosis:       Left lower quadrant abdominal pain      Irritable bowel syndrome with diarrhea      Other constipation      Change in bowel habits      (Left lower quadrant abdominal pain [R10.32])      (Irritable bowel syndrome with diarrhea [K58.0])      (Other constipation [K59.09])      (Change in bowel habits [R19.4])    Surgeons: Laurel Neely MD Provider: Pascual Pereira MD    Anesthesia Type: MAC ASA Status: 2          Anesthesia Type: MAC    Vitals  Vitals Value Taken Time   /82 08/08/22 1350   Temp 36.7 °C (98.1 °F) 08/08/22 1340   Pulse 73 08/08/22 1350   Resp 16 08/08/22 1350   SpO2 98 % 08/08/22 1350           Post Anesthesia Care and Evaluation    Patient location during evaluation: bedside  Pain management: adequate    Airway patency: patent  Anesthetic complications: No anesthetic complications    Cardiovascular status: acceptable  Respiratory status: acceptable  Hydration status: acceptable      
This was a shared visit with the ZELDA. I reviewed and verified the documentation and independently performed the documented:

## (undated) DEVICE — ADAPT CLN BIOGUARD AIR/H2O DISP

## (undated) DEVICE — KT ORCA ORCAPOD DISP STRL

## (undated) DEVICE — CANN O2 ETCO2 FITS ALL CONN CO2 SMPL A/ 7IN DISP LF

## (undated) DEVICE — TUBING, SUCTION, 1/4" X 10', STRAIGHT: Brand: MEDLINE

## (undated) DEVICE — SINGLE-USE BIOPSY FORCEPS: Brand: RADIAL JAW 4

## (undated) DEVICE — LN SMPL CO2 SHTRM SD STREAM W/M LUER

## (undated) DEVICE — SENSR O2 OXIMAX FNGR A/ 18IN NONSTR